# Patient Record
Sex: FEMALE | Race: WHITE | NOT HISPANIC OR LATINO | Employment: FULL TIME | URBAN - METROPOLITAN AREA
[De-identification: names, ages, dates, MRNs, and addresses within clinical notes are randomized per-mention and may not be internally consistent; named-entity substitution may affect disease eponyms.]

---

## 2021-02-22 ENCOUNTER — TELEPHONE (OUTPATIENT)
Dept: OBSTETRICS AND GYNECOLOGY | Facility: CLINIC | Age: 36
End: 2021-02-22

## 2021-02-22 DIAGNOSIS — N75.1 BARTHOLIN'S GLAND ABSCESS: Primary | ICD-10-CM

## 2021-02-22 RX ORDER — LEVOFLOXACIN 500 MG/1
500 TABLET, FILM COATED ORAL DAILY
Qty: 7 TABLET | Refills: 0 | Status: SHIPPED | OUTPATIENT
Start: 2021-02-22 | End: 2021-03-01

## 2021-02-22 RX ORDER — BUPROPION HYDROCHLORIDE 150 MG/1
150 TABLET ORAL DAILY
COMMUNITY
End: 2023-03-27

## 2021-02-22 RX ORDER — LABETALOL 300 MG/1
300 TABLET, FILM COATED ORAL 2 TIMES DAILY
COMMUNITY

## 2021-02-23 ENCOUNTER — PROCEDURE VISIT (OUTPATIENT)
Dept: OBSTETRICS AND GYNECOLOGY | Facility: CLINIC | Age: 36
End: 2021-02-23
Payer: COMMERCIAL

## 2021-02-23 DIAGNOSIS — N75.0 BARTHOLIN'S GLAND CYST: Primary | ICD-10-CM

## 2021-02-23 DIAGNOSIS — N75.1 BARTHOLIN'S GLAND ABSCESS: ICD-10-CM

## 2021-02-23 PROCEDURE — 56420 I&D BARTHOLINS GLAND ABSCESS: CPT | Mod: S$GLB,,, | Performed by: OBSTETRICS & GYNECOLOGY

## 2021-02-23 PROCEDURE — 56420 INCISION AND DRAINAGE: ICD-10-PCS | Mod: S$GLB,,, | Performed by: OBSTETRICS & GYNECOLOGY

## 2021-02-25 LAB — CULTURE: NORMAL

## 2021-03-02 ENCOUNTER — TELEPHONE (OUTPATIENT)
Dept: OBSTETRICS AND GYNECOLOGY | Facility: CLINIC | Age: 36
End: 2021-03-02

## 2021-03-08 RX ORDER — TIZANIDINE 4 MG/1
TABLET ORAL
COMMUNITY
Start: 2021-02-23

## 2021-03-08 RX ORDER — GABAPENTIN 300 MG/1
CAPSULE ORAL
COMMUNITY
Start: 2021-02-23

## 2021-03-09 ENCOUNTER — OFFICE VISIT (OUTPATIENT)
Dept: OBSTETRICS AND GYNECOLOGY | Facility: CLINIC | Age: 36
End: 2021-03-09
Payer: COMMERCIAL

## 2021-03-09 ENCOUNTER — TELEPHONE (OUTPATIENT)
Dept: OBSTETRICS AND GYNECOLOGY | Facility: CLINIC | Age: 36
End: 2021-03-09

## 2021-03-09 VITALS
HEART RATE: 97 BPM | HEIGHT: 65 IN | WEIGHT: 166.38 LBS | DIASTOLIC BLOOD PRESSURE: 72 MMHG | SYSTOLIC BLOOD PRESSURE: 122 MMHG | BODY MASS INDEX: 27.72 KG/M2

## 2021-03-09 DIAGNOSIS — Z01.818 PREOPERATIVE EXAM FOR GYNECOLOGIC SURGERY: Primary | ICD-10-CM

## 2021-03-09 DIAGNOSIS — G89.18 POST-OP PAIN: ICD-10-CM

## 2021-03-09 DIAGNOSIS — Z01.419 ENCOUNTER FOR WELL WOMAN EXAM WITH ROUTINE GYNECOLOGICAL EXAM: ICD-10-CM

## 2021-03-09 DIAGNOSIS — N75.0 BARTHOLIN'S GLAND CYST: ICD-10-CM

## 2021-03-09 LAB
ANION GAP SERPL CALC-SCNC: 10 MMOL/L (ref 3–11)
APPEARANCE, UA: CLEAR
B-HCG UR QL: NEGATIVE
BASOPHILS NFR BLD: 0.5 % (ref 0–3)
BILIRUB UR QL STRIP: NEGATIVE MG/DL
BUN SERPL-MCNC: 12 MG/DL (ref 7–18)
BUN/CREAT SERPL: 17.64 RATIO (ref 7–18)
CALCIUM BLD-MCNC: POSITIVE MG/DL
CALCIUM SERPL-MCNC: 9.3 MG/DL (ref 8.8–10.5)
CHLORIDE SERPL-SCNC: 104 MMOL/L (ref 100–108)
CO2 SERPL-SCNC: 27 MMOL/L (ref 21–32)
COLOR UR: NORMAL
COV PREGNANCY STATUS: ABNORMAL
COVID-19 AB, IGM: NEGATIVE
CREAT SERPL-MCNC: 0.68 MG/DL (ref 0.55–1.02)
EOSINOPHIL NFR BLD: 1.7 % (ref 1–3)
ERYTHROCYTE [DISTWIDTH] IN BLOOD BY AUTOMATED COUNT: 12.4 % (ref 12.5–18)
GFR ESTIMATION: > 60
GLUCOSE (UA): NORMAL MG/DL
GLUCOSE SERPL-MCNC: 100 MG/DL (ref 70–110)
HCT VFR BLD AUTO: 38.4 % (ref 37–47)
HGB BLD-MCNC: 12.5 G/DL (ref 12–16)
HGB UR QL STRIP: NEGATIVE /UL
KETONES UR QL STRIP: NEGATIVE MG/DL
LEUKOCYTE ESTERASE UR QL STRIP: NEGATIVE /UL
LYMPHOCYTES NFR BLD: 27.5 % (ref 25–40)
MCH RBC QN AUTO: 29 PG (ref 27–31.2)
MCHC RBC AUTO-ENTMCNC: 32.6 G/DL (ref 31.8–35.4)
MCV RBC AUTO: 89.1 FL (ref 80–97)
MONOCYTES NFR BLD: 7.6 % (ref 1–15)
NEUTROPHILS # BLD AUTO: 6.17 10*3/UL (ref 1.8–7.7)
NEUTROPHILS NFR BLD: 62.2 % (ref 37–80)
NITRITE UR QL STRIP: NEGATIVE
NUCLEATED RED BLOOD CELLS: 0 %
PATIENT EMPLOYED IN HEALTHCARE: NO
PATIENT HAS SYMPTOMS FOR CONDITION OF INTEREST: NO
PATIENT HOSPITALIZED BC COND: NO
PATIENT IN CONGREGATE CARE: NO
PH UR STRIP: 8 PH (ref 5–9)
PLATELETS: 331 10*3/UL (ref 142–424)
POTASSIUM SERPL-SCNC: 4.4 MMOL/L (ref 3.6–5.2)
PROT UR QL STRIP: NEGATIVE MG/DL
RBC # BLD AUTO: 4.31 10*6/UL (ref 4.2–5.4)
RPR: NON REACTIVE
SODIUM BLD-SCNC: 141 MMOL/L (ref 135–145)
SP GR UR STRIP: 1.01 (ref 1–1.03)
SPECIMEN COLLECTION METHOD, URINE: NORMAL
UROBILINOGEN UR STRIP-ACNC: NORMAL MG/DL
WBC # BLD: 9.9 10*3/UL (ref 4.6–10.2)

## 2021-03-09 PROCEDURE — 99395 PR PREVENTIVE VISIT,EST,18-39: ICD-10-PCS | Mod: S$GLB,,, | Performed by: OBSTETRICS & GYNECOLOGY

## 2021-03-09 PROCEDURE — 99214 PR OFFICE/OUTPT VISIT, EST, LEVL IV, 30-39 MIN: ICD-10-PCS | Mod: 25,S$GLB,, | Performed by: OBSTETRICS & GYNECOLOGY

## 2021-03-09 PROCEDURE — 99214 OFFICE O/P EST MOD 30 MIN: CPT | Mod: 25,S$GLB,, | Performed by: OBSTETRICS & GYNECOLOGY

## 2021-03-09 PROCEDURE — 99395 PREV VISIT EST AGE 18-39: CPT | Mod: S$GLB,,, | Performed by: OBSTETRICS & GYNECOLOGY

## 2021-03-09 RX ORDER — HYDROCODONE BITARTRATE AND ACETAMINOPHEN 5; 325 MG/1; MG/1
1 TABLET ORAL EVERY 6 HOURS PRN
Qty: 12 TABLET | Refills: 0 | Status: SHIPPED | OUTPATIENT
Start: 2021-03-09 | End: 2021-03-23

## 2021-03-12 ENCOUNTER — OUTSIDE PLACE OF SERVICE (OUTPATIENT)
Dept: OBSTETRICS AND GYNECOLOGY | Facility: CLINIC | Age: 36
End: 2021-03-12
Payer: COMMERCIAL

## 2021-03-12 ENCOUNTER — TELEPHONE (OUTPATIENT)
Dept: OBSTETRICS AND GYNECOLOGY | Facility: CLINIC | Age: 36
End: 2021-03-12

## 2021-03-12 DIAGNOSIS — T88.59XA NAUSEA AFTER ANESTHESIA, INITIAL ENCOUNTER: Primary | ICD-10-CM

## 2021-03-12 DIAGNOSIS — R11.0 NAUSEA AFTER ANESTHESIA, INITIAL ENCOUNTER: Primary | ICD-10-CM

## 2021-03-12 PROCEDURE — 56440 MRSPLZATN BRTHLNS GLND CST: CPT | Mod: ,,, | Performed by: OBSTETRICS & GYNECOLOGY

## 2021-03-12 PROCEDURE — 56440 PR MARSUP BARTHOLIN GLAND CYST: ICD-10-PCS | Mod: ,,, | Performed by: OBSTETRICS & GYNECOLOGY

## 2021-03-12 RX ORDER — ONDANSETRON 4 MG/1
4 TABLET, ORALLY DISINTEGRATING ORAL EVERY 8 HOURS PRN
Qty: 12 TABLET | Refills: 1 | Status: SHIPPED | OUTPATIENT
Start: 2021-03-12 | End: 2021-03-23

## 2021-03-12 RX ORDER — PROMETHAZINE HYDROCHLORIDE 25 MG/1
25 SUPPOSITORY RECTAL EVERY 6 HOURS PRN
Qty: 12 SUPPOSITORY | Refills: 1 | Status: SHIPPED | OUTPATIENT
Start: 2021-03-12 | End: 2021-03-23

## 2021-03-16 ENCOUNTER — TELEPHONE (OUTPATIENT)
Dept: OBSTETRICS AND GYNECOLOGY | Facility: CLINIC | Age: 36
End: 2021-03-16

## 2021-03-16 LAB — SPECIMEN TO PATHOLOGY: NORMAL

## 2021-03-23 ENCOUNTER — OFFICE VISIT (OUTPATIENT)
Dept: OBSTETRICS AND GYNECOLOGY | Facility: CLINIC | Age: 36
End: 2021-03-23
Payer: COMMERCIAL

## 2021-03-23 VITALS
SYSTOLIC BLOOD PRESSURE: 114 MMHG | HEIGHT: 65 IN | HEART RATE: 101 BPM | BODY MASS INDEX: 28.39 KG/M2 | WEIGHT: 170.38 LBS | DIASTOLIC BLOOD PRESSURE: 76 MMHG

## 2021-03-23 DIAGNOSIS — R39.15 URINARY URGENCY: ICD-10-CM

## 2021-03-23 DIAGNOSIS — B37.31 YEAST VAGINITIS: ICD-10-CM

## 2021-03-23 DIAGNOSIS — Z98.890 POST-OPERATIVE STATE: Primary | ICD-10-CM

## 2021-03-23 PROCEDURE — 1125F AMNT PAIN NOTED PAIN PRSNT: CPT | Mod: S$GLB,,, | Performed by: OBSTETRICS & GYNECOLOGY

## 2021-03-23 PROCEDURE — 99024 PR POST-OP FOLLOW-UP VISIT: ICD-10-PCS | Mod: S$GLB,,, | Performed by: OBSTETRICS & GYNECOLOGY

## 2021-03-23 PROCEDURE — 3008F BODY MASS INDEX DOCD: CPT | Mod: CPTII,S$GLB,, | Performed by: OBSTETRICS & GYNECOLOGY

## 2021-03-23 PROCEDURE — 3008F PR BODY MASS INDEX (BMI) DOCUMENTED: ICD-10-PCS | Mod: CPTII,S$GLB,, | Performed by: OBSTETRICS & GYNECOLOGY

## 2021-03-23 PROCEDURE — 1125F PR PAIN SEVERITY QUANTIFIED, PAIN PRESENT: ICD-10-PCS | Mod: S$GLB,,, | Performed by: OBSTETRICS & GYNECOLOGY

## 2021-03-23 PROCEDURE — 99024 POSTOP FOLLOW-UP VISIT: CPT | Mod: S$GLB,,, | Performed by: OBSTETRICS & GYNECOLOGY

## 2021-03-23 RX ORDER — NITROFURANTOIN 25; 75 MG/1; MG/1
100 CAPSULE ORAL 2 TIMES DAILY
Qty: 14 CAPSULE | Refills: 0 | Status: SHIPPED | OUTPATIENT
Start: 2021-03-23 | End: 2021-03-30

## 2021-03-23 RX ORDER — FLUCONAZOLE 150 MG/1
150 TABLET ORAL EVERY OTHER DAY
Qty: 3 TABLET | Refills: 0 | Status: SHIPPED | OUTPATIENT
Start: 2021-03-23 | End: 2021-03-28

## 2021-03-24 LAB
AMORPH URATE CRY URNS QL MICRO: ABNORMAL
BACTERIA #/AREA URNS HPF: ABNORMAL /[HPF]
BILIRUB UR QL STRIP: NEGATIVE
CLARITY UR: ABNORMAL
COLOR UR: YELLOW
EPITHELIAL CELLS: ABNORMAL
GLUCOSE (UA): NEGATIVE MG/DL
KETONES UR QL STRIP: NEGATIVE MG/DL
LEUKOCYTE ESTERASE UR QL STRIP: NEGATIVE
MUCOUS THREADS URNS QL MICRO: NEGATIVE
NITRITE UR QL STRIP: NEGATIVE
OCCULT BLOOD: NEGATIVE
PH, URINE: 8 (ref 5–7.5)
PROT UR QL STRIP: NEGATIVE MG/DL
RBC/HPF: NEGATIVE
SP GR UR STRIP: 1.01 (ref 1–1.03)
UROBILINOGEN, URINE: NORMAL E.U./DL (ref 0–1)
WBC/HPF: ABNORMAL

## 2021-03-26 LAB — URINE CULTURE, ROUTINE: NORMAL

## 2021-04-08 ENCOUNTER — OFFICE VISIT (OUTPATIENT)
Dept: OBSTETRICS AND GYNECOLOGY | Facility: CLINIC | Age: 36
End: 2021-04-08
Payer: COMMERCIAL

## 2021-04-08 VITALS
BODY MASS INDEX: 28.93 KG/M2 | HEART RATE: 89 BPM | WEIGHT: 173.63 LBS | DIASTOLIC BLOOD PRESSURE: 79 MMHG | SYSTOLIC BLOOD PRESSURE: 124 MMHG | HEIGHT: 65 IN

## 2021-04-08 DIAGNOSIS — Z98.890 POST-OPERATIVE STATE: Primary | ICD-10-CM

## 2021-04-08 PROCEDURE — 3008F PR BODY MASS INDEX (BMI) DOCUMENTED: ICD-10-PCS | Mod: CPTII,S$GLB,, | Performed by: OBSTETRICS & GYNECOLOGY

## 2021-04-08 PROCEDURE — 1126F PR PAIN SEVERITY QUANTIFIED, NO PAIN PRESENT: ICD-10-PCS | Mod: S$GLB,,, | Performed by: OBSTETRICS & GYNECOLOGY

## 2021-04-08 PROCEDURE — 1126F AMNT PAIN NOTED NONE PRSNT: CPT | Mod: S$GLB,,, | Performed by: OBSTETRICS & GYNECOLOGY

## 2021-04-08 PROCEDURE — 3008F BODY MASS INDEX DOCD: CPT | Mod: CPTII,S$GLB,, | Performed by: OBSTETRICS & GYNECOLOGY

## 2021-04-08 PROCEDURE — 99024 POSTOP FOLLOW-UP VISIT: CPT | Mod: S$GLB,,, | Performed by: OBSTETRICS & GYNECOLOGY

## 2021-04-08 PROCEDURE — 99024 PR POST-OP FOLLOW-UP VISIT: ICD-10-PCS | Mod: S$GLB,,, | Performed by: OBSTETRICS & GYNECOLOGY

## 2022-03-23 ENCOUNTER — OFFICE VISIT (OUTPATIENT)
Dept: OBSTETRICS AND GYNECOLOGY | Facility: CLINIC | Age: 37
End: 2022-03-23
Payer: COMMERCIAL

## 2022-03-23 VITALS
HEART RATE: 90 BPM | SYSTOLIC BLOOD PRESSURE: 131 MMHG | WEIGHT: 171.19 LBS | DIASTOLIC BLOOD PRESSURE: 91 MMHG | HEIGHT: 65 IN | BODY MASS INDEX: 28.52 KG/M2

## 2022-03-23 DIAGNOSIS — Z01.419 ENCOUNTER FOR WELL WOMAN EXAM WITH ROUTINE GYNECOLOGICAL EXAM: Primary | ICD-10-CM

## 2022-03-23 DIAGNOSIS — Z71.85 HPV VACCINE COUNSELING: ICD-10-CM

## 2022-03-23 DIAGNOSIS — Z12.31 BREAST CANCER SCREENING BY MAMMOGRAM: ICD-10-CM

## 2022-03-23 PROCEDURE — 3008F BODY MASS INDEX DOCD: CPT | Mod: CPTII,S$GLB,, | Performed by: OBSTETRICS & GYNECOLOGY

## 2022-03-23 PROCEDURE — 3080F DIAST BP >= 90 MM HG: CPT | Mod: CPTII,S$GLB,, | Performed by: OBSTETRICS & GYNECOLOGY

## 2022-03-23 PROCEDURE — 1159F MED LIST DOCD IN RCRD: CPT | Mod: CPTII,S$GLB,, | Performed by: OBSTETRICS & GYNECOLOGY

## 2022-03-23 PROCEDURE — 90651 HPV VACCINE 9-VALENT 3 DOSE IM: ICD-10-PCS | Mod: S$GLB,,, | Performed by: OBSTETRICS & GYNECOLOGY

## 2022-03-23 PROCEDURE — 90471 PR IMMUNIZ ADMIN,1 SINGLE/COMB VAC/TOXOID: ICD-10-PCS | Mod: S$GLB,,, | Performed by: OBSTETRICS & GYNECOLOGY

## 2022-03-23 PROCEDURE — 1160F RVW MEDS BY RX/DR IN RCRD: CPT | Mod: CPTII,S$GLB,, | Performed by: OBSTETRICS & GYNECOLOGY

## 2022-03-23 PROCEDURE — 1159F PR MEDICATION LIST DOCUMENTED IN MEDICAL RECORD: ICD-10-PCS | Mod: CPTII,S$GLB,, | Performed by: OBSTETRICS & GYNECOLOGY

## 2022-03-23 PROCEDURE — 99395 PREV VISIT EST AGE 18-39: CPT | Mod: 25,S$GLB,, | Performed by: OBSTETRICS & GYNECOLOGY

## 2022-03-23 PROCEDURE — 4010F PR ACE/ARB THEARPY RXD/TAKEN: ICD-10-PCS | Mod: CPTII,S$GLB,, | Performed by: OBSTETRICS & GYNECOLOGY

## 2022-03-23 PROCEDURE — 3075F SYST BP GE 130 - 139MM HG: CPT | Mod: CPTII,S$GLB,, | Performed by: OBSTETRICS & GYNECOLOGY

## 2022-03-23 PROCEDURE — 90471 IMMUNIZATION ADMIN: CPT | Mod: S$GLB,,, | Performed by: OBSTETRICS & GYNECOLOGY

## 2022-03-23 PROCEDURE — 3008F PR BODY MASS INDEX (BMI) DOCUMENTED: ICD-10-PCS | Mod: CPTII,S$GLB,, | Performed by: OBSTETRICS & GYNECOLOGY

## 2022-03-23 PROCEDURE — 3075F PR MOST RECENT SYSTOLIC BLOOD PRESS GE 130-139MM HG: ICD-10-PCS | Mod: CPTII,S$GLB,, | Performed by: OBSTETRICS & GYNECOLOGY

## 2022-03-23 PROCEDURE — 3080F PR MOST RECENT DIASTOLIC BLOOD PRESSURE >= 90 MM HG: ICD-10-PCS | Mod: CPTII,S$GLB,, | Performed by: OBSTETRICS & GYNECOLOGY

## 2022-03-23 PROCEDURE — 4010F ACE/ARB THERAPY RXD/TAKEN: CPT | Mod: CPTII,S$GLB,, | Performed by: OBSTETRICS & GYNECOLOGY

## 2022-03-23 PROCEDURE — 1160F PR REVIEW ALL MEDS BY PRESCRIBER/CLIN PHARMACIST DOCUMENTED: ICD-10-PCS | Mod: CPTII,S$GLB,, | Performed by: OBSTETRICS & GYNECOLOGY

## 2022-03-23 PROCEDURE — 99395 PR PREVENTIVE VISIT,EST,18-39: ICD-10-PCS | Mod: 25,S$GLB,, | Performed by: OBSTETRICS & GYNECOLOGY

## 2022-03-23 PROCEDURE — 90651 9VHPV VACCINE 2/3 DOSE IM: CPT | Mod: S$GLB,,, | Performed by: OBSTETRICS & GYNECOLOGY

## 2022-03-23 RX ORDER — LISINOPRIL 10 MG/1
10 TABLET ORAL DAILY
COMMUNITY
Start: 2022-03-07 | End: 2023-03-27

## 2022-03-23 RX ORDER — METHYLPREDNISOLONE 4 MG/1
TABLET ORAL
COMMUNITY
Start: 2022-03-15 | End: 2022-03-23

## 2022-03-23 RX ORDER — DOXYCYCLINE 100 MG/1
100 CAPSULE ORAL EVERY 12 HOURS
COMMUNITY
Start: 2022-03-08 | End: 2022-03-23

## 2022-03-23 NOTE — PROGRESS NOTES
CC: WELL WOMAN (age 26-39)     Patient Care Team:  Caridad Pugh MD as PCP - General (Family Medicine)  Hernan Allen MD (Physical Medicine and Rehabilitation)  Halle Villavicencio MD as Consulting Physician (Gastroenterology)    The patient's last visit with me was on 2021 for post op for excision of left bartholins gland    HPI:  Patient is a 37 y.o. who presents for her well woman exam today.  History reviewed with patient.   Patient is without complaints or concerns today. Periods still very regular every 37-38 days    Patient's last menstrual period was 2022 (exact date).      CONTRACEPTION: BTL  GARDASIL: no and would like to get a vaccination today while she is here counseled on benefits and risks of Gardasil-9 vaccination and all questions answered. VIS given to patient to review prior to vaccination. Consent given   HX ABNL PAPS: none    REVIEW OF DATA/ HEALTH MAINTENANCE  LAST ANNUAL/ PAP:   2021    LAST LABS- due now- does with pcp    Past Medical History:   Diagnosis Date    Anxiety     HTN (hypertension)     Thyroid nodule      SURGICAL HX:   has a past surgical history that includes  section ();  section with tubal ligation (); Appendectomy; Marsupialization of Bartholin's gland cyst (Left, ); and excision of bartholins (Left, ).    SOCIAL HX:   reports that she has never smoked. She has never used smokeless tobacco. She reports current alcohol use. She reports that she does not use drugs.    FAMILY HX:  family history includes Hypertension in her mother. .    ALLERGIES:  Clindamycin, Pcn [penicillins], and Sulfa (sulfonamide antibiotics)    Current Outpatient Medications:     buPROPion (WELLBUTRIN XL) 150 MG TB24 tablet, Take 150 mg by mouth once daily., Disp: , Rfl:     gabapentin (NEURONTIN) 300 MG capsule, , Disp: , Rfl:     labetaloL (NORMODYNE) 300 MG tablet, Take 300 mg by mouth 2 (two) times daily., Disp: , Rfl:      "lisinopriL 10 MG tablet, Take 10 mg by mouth once daily., Disp: , Rfl:     tiZANidine (ZANAFLEX) 4 MG tablet, , Disp: , Rfl:     ROS:  CONST:  No fever, chills, fatigue or unexpected changes in weight  CV: No chest pain or palpitations  RESP:  No shortness of breath or cough  GI: No abd pain, vomiting, diarrhea, blood in stool, or changes in bowel mvmts  SKIN: No rashes or lesions  MUSCULOSKELETAL: No joint swelling or pain  PSYCH: No changes in mood or insomia  BREASTS: No asymmetry, lumps, pain, nipple discharge, or skin changes  :  No dysuria, urgency, frequency, hematuria or incontinence          No vag dc, itching, odor or dryness          No pelvic pain, dyspareunia, or abnormal vaginal bleeding    VITALS:  Blood pressure (!) 131/91, pulse 90, height 5' 5" (1.651 m), weight 77.7 kg (171 lb 3.2 oz), last menstrual period 03/06/2022.  Body mass index is 28.49 kg/m².   PHYSICAL EXAM-  APPEARANCE: Well appearing, in no acute distress.  NECK: Neck symmetric without masses or thyromegaly.  CV:  Normal rate  PULM: Normal resp rate, no resp distress, normal resp effort  PSYCH: Normal mood and affect, cooperative  SKIN: No rashes, lesions, or abnormal bruising  LYMPH: No inguinal or axillary adenopathy  ABD: Soft, without tenderness or masses. No palpable hernias or hsm  BREASTS: Symmetrical, no skin changes or lesions. No palpable masses, tenderness, or nipple dc  PELVIC:  VULVA: No lesions. Normal female genitalia.  URETHRAL MEATUS: No masses, no prolapse.  BLADDER/ URETHRA: No masses or suprapubic tenderness  VAGINA: No discharge, erythema, or lesions. No significant cystocele or rectocele.   CVX: No lesions,no cervical motion tenderness   UTERUS: Normal size/shape, mobile, non-tender  ADNEXA: No masses, tenderness, or fullness.  ANUS/ PERINEUM: Normal tone.  No lesions.  *female chaperone present for entire exam    ASSESSMENT and PLAN:  Encounter for well woman exam with routine gynecological exam  -     " Liquid-based pap smear, screening    HPV vaccine counseling  -     HPV Vaccine (9-Valent) (3 Dose) (IM)    Breast cancer screening by mammogram  -     Mammo Digital Screening Bilat w/ Delbert; Future; Expected date: 04/06/2022     FOLLOWUP:  1 yr for wwe or sooner prn    COUNSELING:  Patient was counseled today on recommendation for yearly pelvic exams, current Pap guidelines, self breast exams, contraception, and recommendations for annual screening mammograms at age 40. Encouraged patient to see her PCP for other health maintenance.

## 2022-05-23 DIAGNOSIS — Z71.85 HPV VACCINE COUNSELING: Primary | ICD-10-CM

## 2022-05-25 ENCOUNTER — CLINICAL SUPPORT (OUTPATIENT)
Dept: OBSTETRICS AND GYNECOLOGY | Facility: CLINIC | Age: 37
End: 2022-05-25
Payer: COMMERCIAL

## 2022-05-25 DIAGNOSIS — Z71.85 HPV VACCINE COUNSELING: ICD-10-CM

## 2022-05-25 PROCEDURE — 90471 HPV VACCINE 9-VALENT 3 DOSE IM: ICD-10-PCS | Mod: S$GLB,,, | Performed by: OBSTETRICS & GYNECOLOGY

## 2022-05-25 PROCEDURE — 90471 IMMUNIZATION ADMIN: CPT | Mod: S$GLB,,, | Performed by: OBSTETRICS & GYNECOLOGY

## 2022-05-25 PROCEDURE — 90651 9VHPV VACCINE 2/3 DOSE IM: CPT | Mod: S$GLB,,, | Performed by: OBSTETRICS & GYNECOLOGY

## 2022-05-25 PROCEDURE — 90651 HPV VACCINE 9-VALENT 3 DOSE IM: ICD-10-PCS | Mod: S$GLB,,, | Performed by: OBSTETRICS & GYNECOLOGY

## 2022-09-26 ENCOUNTER — CLINICAL SUPPORT (OUTPATIENT)
Dept: OBSTETRICS AND GYNECOLOGY | Facility: CLINIC | Age: 37
End: 2022-09-26
Payer: COMMERCIAL

## 2022-09-26 VITALS
BODY MASS INDEX: 29.95 KG/M2 | HEART RATE: 85 BPM | WEIGHT: 169 LBS | DIASTOLIC BLOOD PRESSURE: 81 MMHG | SYSTOLIC BLOOD PRESSURE: 122 MMHG | HEIGHT: 63 IN

## 2022-09-26 DIAGNOSIS — Z71.85 HPV VACCINE COUNSELING: ICD-10-CM

## 2022-09-26 DIAGNOSIS — Z71.85 HPV VACCINE COUNSELING: Primary | ICD-10-CM

## 2022-09-26 PROCEDURE — 90651 HPV VACCINE 9-VALENT 3 DOSE IM: ICD-10-PCS | Mod: S$GLB,,, | Performed by: OBSTETRICS & GYNECOLOGY

## 2022-09-26 PROCEDURE — 90471 IMMUNIZATION ADMIN: CPT | Mod: S$GLB,,, | Performed by: OBSTETRICS & GYNECOLOGY

## 2022-09-26 PROCEDURE — 90471 HPV VACCINE 9-VALENT 3 DOSE IM: ICD-10-PCS | Mod: S$GLB,,, | Performed by: OBSTETRICS & GYNECOLOGY

## 2022-09-26 PROCEDURE — 90651 9VHPV VACCINE 2/3 DOSE IM: CPT | Mod: S$GLB,,, | Performed by: OBSTETRICS & GYNECOLOGY

## 2022-09-26 NOTE — PROGRESS NOTES
Patient's allergies verified. Patient's name and date of birth verified. Injection given IM in right deltoid. Patient waited 15 minutes after injection prior to leaving.

## 2023-03-27 RX ORDER — LOSARTAN POTASSIUM 25 MG/1
TABLET ORAL
COMMUNITY
Start: 2022-12-06

## 2023-03-27 RX ORDER — BUPROPION HYDROCHLORIDE 300 MG/1
TABLET ORAL
COMMUNITY
Start: 2022-10-17

## 2023-03-27 NOTE — PROGRESS NOTES
CC: WELL WOMAN (age 26-39)     Patient Care Team:  Caridad Pugh MD as PCP - General (Family Medicine)  Hernan Allen MD (Physical Medicine and Rehabilitation)  Halle Villavicencio MD as Consulting Physician (Gastroenterology)    Last visit with me was on  3/23/2022 for wwe    HPI:  Patient is a 38 y.o. who presents for her well woman exam today.  History reviewed with patient.   Patient is without complaints or concerns today. Has noticed off and on in lateral breasts and sometimes medial breasts, she will feel little tender lumps. 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     CONTRACEPTION: BTL  GARDASIL: no- declines  HX ABNL PAPS: none    REVIEW OF DATA/ HEALTH MAINTENANCE  LAST ANNUAL:   2023  LAST LABS- up to date with PCP     Past Medical History:   Diagnosis Date    Anxiety     HTN (hypertension)     Thyroid nodule      SURGICAL HX:   has a past surgical history that includes  section ();  section with tubal ligation (); Appendectomy; Marsupialization of Bartholin's gland cyst (Left, ); and excision of bartholins (Left, ).    SOCIAL HX:   reports that she has never smoked. She has never used smokeless tobacco. She reports current alcohol use. She reports that she does not use drugs.    FAMILY HX:  family history includes Hypertension in her mother. .    ALLERGIES:  Clindamycin, Pcn [penicillins], and Sulfa (sulfonamide antibiotics)    Current Outpatient Medications   Medication Instructions    buPROPion (WELLBUTRIN XL) 300 MG 24 hr tablet No dose, route, or frequency recorded.    gabapentin (NEURONTIN) 300 MG capsule No dose, route, or frequency recorded.    labetaloL (NORMODYNE) 300 mg, Oral, 2 times daily    losartan (COZAAR) 25 MG tablet No dose, route, or frequency recorded.    tiZANidine (ZANAFLEX) 4 MG tablet No dose, route, or frequency recorded.     ROS:  CONST:  No fever, chills, fatigue or unexpected changes in weight   CV:  "No chest pain or palpitations   RESP:  No shortness of breath or cough   GI: No abd pain, vomiting, diarrhea, blood in stool, or changes in bowel mvmts   SKIN: No rashes or lesions  MUSCULOSKELETAL: No joint swelling or pain   PSYCH: No changes in mood or insomia   BREASTS: No asymmetry, lumps, pain, nipple discharge, or skin changes   :  No dysuria, urgency, frequency, hematuria or incontinence           No vag dc, itching, odor or dryness           No pelvic pain, dyspareunia, or abnormal vaginal bleeding     VITALS:  Blood pressure (!) 145/91, height 5' 3" (1.6 m), weight 75.8 kg (167 lb), last menstrual period 03/29/2023.  Body mass index is 29.58 kg/m².     PHYSICAL EXAM-  APPEARANCE: Well appearing, in no acute distress.   NECK: Neck symmetric.   CV:  Normal rate   PULM: Normal resp rate, no resp distress, normal resp effort    PSYCH: Normal mood and affect, cooperative   SKIN: No rashes, lesions, or abnormal bruising   LYMPH: No inguinal or axillary adenopathy   ABD: Soft, without tenderness or masses.   BREAST: Symmetrical, no nipple changes, no skin changes, No palpable masses   PELVIC:  VULVA: No lesions. Normal female genitalia.  URETHRAL MEATUS: No masses, no significant prolapse.   BLADDER/ URETHRA: No masses or suprapubic tenderness   VAGINA: No lesions or erythema, No discharge.   CVX: No lesions,no cervical motion tenderness.   UTERUS: Normal size/shape, mobile, non-tender   ADNEXA: No masses, tenderness, or fullness.   ANUS/ PERINEUM: Normal tone.  No lesions.     *female chaperone present for entire exam      ASSESSMENT and PLAN:  Encounter for well woman exam with routine gynecological exam  -     Liquid-based pap smear, screening     FOLLOWUP:  1 yr for wwe or sooner prn    COUNSELING:  Patient was counseled today on recommendation for yearly pelvic exams, current Pap guidelines, self breast exams, contraception, and recommendations for annual screening mammograms at age 40. Encouraged patient " to see her PCP for other health maintenance.

## 2023-03-29 ENCOUNTER — OFFICE VISIT (OUTPATIENT)
Dept: OBSTETRICS AND GYNECOLOGY | Facility: CLINIC | Age: 38
End: 2023-03-29
Payer: COMMERCIAL

## 2023-03-29 VITALS
WEIGHT: 167 LBS | DIASTOLIC BLOOD PRESSURE: 91 MMHG | BODY MASS INDEX: 29.59 KG/M2 | SYSTOLIC BLOOD PRESSURE: 145 MMHG | HEIGHT: 63 IN

## 2023-03-29 DIAGNOSIS — Z01.419 ENCOUNTER FOR WELL WOMAN EXAM WITH ROUTINE GYNECOLOGICAL EXAM: Primary | ICD-10-CM

## 2023-03-29 PROCEDURE — 4010F ACE/ARB THERAPY RXD/TAKEN: CPT | Mod: CPTII,S$GLB,, | Performed by: OBSTETRICS & GYNECOLOGY

## 2023-03-29 PROCEDURE — 1159F MED LIST DOCD IN RCRD: CPT | Mod: CPTII,S$GLB,, | Performed by: OBSTETRICS & GYNECOLOGY

## 2023-03-29 PROCEDURE — 3008F PR BODY MASS INDEX (BMI) DOCUMENTED: ICD-10-PCS | Mod: CPTII,S$GLB,, | Performed by: OBSTETRICS & GYNECOLOGY

## 2023-03-29 PROCEDURE — 4010F PR ACE/ARB THEARPY RXD/TAKEN: ICD-10-PCS | Mod: CPTII,S$GLB,, | Performed by: OBSTETRICS & GYNECOLOGY

## 2023-03-29 PROCEDURE — 99395 PREV VISIT EST AGE 18-39: CPT | Mod: S$GLB,,, | Performed by: OBSTETRICS & GYNECOLOGY

## 2023-03-29 PROCEDURE — 3080F PR MOST RECENT DIASTOLIC BLOOD PRESSURE >= 90 MM HG: ICD-10-PCS | Mod: CPTII,S$GLB,, | Performed by: OBSTETRICS & GYNECOLOGY

## 2023-03-29 PROCEDURE — 3077F PR MOST RECENT SYSTOLIC BLOOD PRESSURE >= 140 MM HG: ICD-10-PCS | Mod: CPTII,S$GLB,, | Performed by: OBSTETRICS & GYNECOLOGY

## 2023-03-29 PROCEDURE — 3008F BODY MASS INDEX DOCD: CPT | Mod: CPTII,S$GLB,, | Performed by: OBSTETRICS & GYNECOLOGY

## 2023-03-29 PROCEDURE — 3080F DIAST BP >= 90 MM HG: CPT | Mod: CPTII,S$GLB,, | Performed by: OBSTETRICS & GYNECOLOGY

## 2023-03-29 PROCEDURE — 3077F SYST BP >= 140 MM HG: CPT | Mod: CPTII,S$GLB,, | Performed by: OBSTETRICS & GYNECOLOGY

## 2023-03-29 PROCEDURE — 1159F PR MEDICATION LIST DOCUMENTED IN MEDICAL RECORD: ICD-10-PCS | Mod: CPTII,S$GLB,, | Performed by: OBSTETRICS & GYNECOLOGY

## 2023-03-29 PROCEDURE — 99395 PR PREVENTIVE VISIT,EST,18-39: ICD-10-PCS | Mod: S$GLB,,, | Performed by: OBSTETRICS & GYNECOLOGY

## 2023-04-05 LAB — Lab: NORMAL

## 2023-04-11 ENCOUNTER — TELEPHONE (OUTPATIENT)
Dept: GASTROENTEROLOGY | Facility: CLINIC | Age: 38
End: 2023-04-11
Payer: COMMERCIAL

## 2024-04-12 ENCOUNTER — PATIENT MESSAGE (OUTPATIENT)
Dept: OBSTETRICS AND GYNECOLOGY | Facility: CLINIC | Age: 39
End: 2024-04-12
Payer: COMMERCIAL

## 2024-05-14 ENCOUNTER — OFFICE VISIT (OUTPATIENT)
Dept: OBSTETRICS AND GYNECOLOGY | Facility: CLINIC | Age: 39
End: 2024-05-14
Payer: COMMERCIAL

## 2024-05-14 VITALS
BODY MASS INDEX: 29.23 KG/M2 | WEIGHT: 165 LBS | HEIGHT: 63 IN | DIASTOLIC BLOOD PRESSURE: 92 MMHG | SYSTOLIC BLOOD PRESSURE: 149 MMHG

## 2024-05-14 DIAGNOSIS — Z12.31 BREAST CANCER SCREENING BY MAMMOGRAM: ICD-10-CM

## 2024-05-14 DIAGNOSIS — Z01.419 ENCOUNTER FOR WELL WOMAN EXAM WITH ROUTINE GYNECOLOGICAL EXAM: Primary | ICD-10-CM

## 2024-05-14 PROCEDURE — 1159F MED LIST DOCD IN RCRD: CPT | Mod: CPTII,S$GLB,, | Performed by: OBSTETRICS & GYNECOLOGY

## 2024-05-14 PROCEDURE — 3008F BODY MASS INDEX DOCD: CPT | Mod: CPTII,S$GLB,, | Performed by: OBSTETRICS & GYNECOLOGY

## 2024-05-14 PROCEDURE — 4010F ACE/ARB THERAPY RXD/TAKEN: CPT | Mod: CPTII,S$GLB,, | Performed by: OBSTETRICS & GYNECOLOGY

## 2024-05-14 PROCEDURE — 99459 PELVIC EXAMINATION: CPT | Mod: S$GLB,,, | Performed by: OBSTETRICS & GYNECOLOGY

## 2024-05-14 PROCEDURE — 3077F SYST BP >= 140 MM HG: CPT | Mod: CPTII,S$GLB,, | Performed by: OBSTETRICS & GYNECOLOGY

## 2024-05-14 PROCEDURE — 99395 PREV VISIT EST AGE 18-39: CPT | Mod: S$GLB,,, | Performed by: OBSTETRICS & GYNECOLOGY

## 2024-05-14 PROCEDURE — 3080F DIAST BP >= 90 MM HG: CPT | Mod: CPTII,S$GLB,, | Performed by: OBSTETRICS & GYNECOLOGY

## 2024-05-14 RX ORDER — LISINOPRIL 10 MG/1
TABLET ORAL
COMMUNITY
Start: 2024-02-26

## 2024-05-14 RX ORDER — AMITRIPTYLINE HYDROCHLORIDE 10 MG/1
TABLET, FILM COATED ORAL
COMMUNITY
Start: 2024-04-30

## 2024-05-14 NOTE — PROGRESS NOTES
"CC: WELL WOMAN (age 26-39)     Patient Care Team:  Caridad Pugh MD as PCP - General (Family Medicine)  Hernan Allen MD (Physical Medicine and Rehabilitation)  Halle Villavicencio MD as Consulting Physician (Gastroenterology)    Last visit with me was on  3/29/2023 for wwe    HPI:  Patient is a 39 y.o. who presents for her well woman exam today.  History reviewed with patient.   Patient is without complaints or concerns today.     CONTRACEPTION: BTL  GARDASIL: no- declines  HX ABNL PAPS: none    REVIEW OF DATA/ HEALTH MAINTENANCE  LAST ANNUAL:   2023  LAST MMG- never     Past Medical History:   Diagnosis Date    Anxiety     HTN (hypertension)     Thyroid nodule      SURGICAL HX:   has a past surgical history that includes  section ();  section with tubal ligation (); Appendectomy; Marsupialization of Bartholin's gland cyst (Left, ); and excision of bartholins (Left, ).    SOCIAL HX:   reports that she has never smoked. She has never used smokeless tobacco. She reports current alcohol use. She reports that she does not use drugs.    Current Outpatient Medications   Medication Instructions    amitriptyline (ELAVIL) 10 MG tablet     buPROPion (WELLBUTRIN XL) 300 MG 24 hr tablet No dose, route, or frequency recorded.    gabapentin (NEURONTIN) 300 MG capsule No dose, route, or frequency recorded.    labetaloL (NORMODYNE) 300 mg, Oral, 2 times daily    lisinopriL 10 MG tablet     losartan (COZAAR) 25 MG tablet No dose, route, or frequency recorded.    tiZANidine (ZANAFLEX) 4 MG tablet No dose, route, or frequency recorded.       VITALS:  Blood pressure (!) 149/92, height 5' 3" (1.6 m), weight 74.8 kg (165 lb), last menstrual period 2024.  Body mass index is 29.23 kg/m².     PHYSICAL EXAM-  APPEARANCE: Well appearing, in no acute distress.  CV/ PULM: no resp distress, normal resp effort  PSYCH: Normal mood and affect, cooperative  SKIN: No rashes, lesions, or " abnormal bruising  ABD: Soft, no masses, tenderness, or distension  BREASTS: No skin changes,nipple dc. No palpable masses or tenderness  PELVIC:  VULVA: Normal female genitalia. No lesions  BLADDER: No suprapubic tenderness  VAGINA/ CVX:  No lesions, no d/c  UTERUS: mobile, non-tender  ADNEXA: No masses, tenderness, or fullness.  ANUS/ PERINEUM: Normal tone.  No lesions.           *patient verbally consented for exam and female chaperone present for entire exam    ASSESSMENT and PLAN:  Encounter for well woman exam with routine gynecological exam  -     Liquid-based pap smear, screening    Breast cancer screening by mammogram  -     Mammo Digital Screening Bilat w/ Delbert; Future; Expected date: 05/28/2024       FOLLOWUP:  1 yr for wwe or sooner prn    COUNSELING:  Patient was counseled today on recommendation for yearly pelvic exams, current Pap guidelines, self breast exams, contraception, and recommendations for annual screening mammograms at age 40. Encouraged patient to see her PCP for other health maintenance.

## 2024-05-16 LAB — Lab: NORMAL

## 2024-07-24 ENCOUNTER — DOCUMENTATION ONLY (OUTPATIENT)
Dept: OBSTETRICS AND GYNECOLOGY | Facility: CLINIC | Age: 39
End: 2024-07-24
Payer: COMMERCIAL

## 2025-05-19 ENCOUNTER — OFFICE VISIT (OUTPATIENT)
Dept: OBSTETRICS AND GYNECOLOGY | Facility: CLINIC | Age: 40
End: 2025-05-19
Payer: COMMERCIAL

## 2025-05-19 VITALS — SYSTOLIC BLOOD PRESSURE: 136 MMHG | DIASTOLIC BLOOD PRESSURE: 89 MMHG | WEIGHT: 168 LBS | BODY MASS INDEX: 29.76 KG/M2

## 2025-05-19 DIAGNOSIS — Z01.419 ENCOUNTER FOR WELL WOMAN EXAM WITH ROUTINE GYNECOLOGICAL EXAM: Primary | ICD-10-CM

## 2025-05-19 DIAGNOSIS — L70.9 ACNE, UNSPECIFIED ACNE TYPE: ICD-10-CM

## 2025-05-19 DIAGNOSIS — Z12.31 BREAST CANCER SCREENING BY MAMMOGRAM: ICD-10-CM

## 2025-05-19 DIAGNOSIS — N92.4 EXCESSIVE BLEEDING IN PREMENOPAUSAL PERIOD: ICD-10-CM

## 2025-05-19 PROCEDURE — 3008F BODY MASS INDEX DOCD: CPT | Mod: CPTII,,, | Performed by: OBSTETRICS & GYNECOLOGY

## 2025-05-19 PROCEDURE — 4010F ACE/ARB THERAPY RXD/TAKEN: CPT | Mod: CPTII,,, | Performed by: OBSTETRICS & GYNECOLOGY

## 2025-05-19 PROCEDURE — 1159F MED LIST DOCD IN RCRD: CPT | Mod: CPTII,,, | Performed by: OBSTETRICS & GYNECOLOGY

## 2025-05-19 PROCEDURE — 99395 PREV VISIT EST AGE 18-39: CPT | Mod: S$PBB,,, | Performed by: OBSTETRICS & GYNECOLOGY

## 2025-05-19 PROCEDURE — 3079F DIAST BP 80-89 MM HG: CPT | Mod: CPTII,,, | Performed by: OBSTETRICS & GYNECOLOGY

## 2025-05-19 PROCEDURE — 3075F SYST BP GE 130 - 139MM HG: CPT | Mod: CPTII,,, | Performed by: OBSTETRICS & GYNECOLOGY

## 2025-05-19 RX ORDER — BUPROPION HCL 150 MG
TABLET, EXTENDED RELEASE 24 HR ORAL
COMMUNITY
Start: 2025-01-06

## 2025-05-19 RX ORDER — PANTOPRAZOLE SODIUM 40 MG/1
40 TABLET, DELAYED RELEASE ORAL
COMMUNITY
Start: 2025-02-17

## 2025-05-19 NOTE — PROGRESS NOTES
CC: WELL WOMAN (age 26-39)     Patient Care Team:  Caridad Pugh MD as PCP - General (Family Medicine)  Hernan Allen MD (Physical Medicine and Rehabilitation)  Halle Villavicencio MD as Consulting Physician (Gastroenterology)        HPI:  Patient is a 40 y.o. who presents for her well woman exam today.  History reviewed with patient.   Periods still regular and still last 5 days but feels like days 2-3 are getting heavier than before. Also has been having more pain with ovulation than before. Could feel it in the past but now is actual pain- not just on one side- has felt on both sides. No dc no intermenstrual bleeding . Also has had some increased acne on her face/chin more recently.     CONTRACEPTION: BTL  GARDASIL: yes x 3  HX ABNL PAPS: none    REVIEW OF DATA/ HEALTH MAINTENANCE  LAST ANNUAL:   May 14 2024   LAST MMG- 2024-  [facility]    Past Medical History:   Diagnosis Date    Anxiety     HTN (hypertension)     Thyroid nodule      SURGICAL HX:   has a past surgical history that includes  section ();  section with tubal ligation (); Appendectomy; Marsupialization of Bartholin's gland cyst (Left, 2019); and excision of bartholins (Left, ).    SOCIAL HX:   reports that she has never smoked. She has never used smokeless tobacco. She reports current alcohol use. She reports that she does not use drugs.    Current Outpatient Medications   Medication Instructions    amitriptyline (ELAVIL) 10 MG tablet     gabapentin (NEURONTIN) 300 MG capsule No dose, route, or frequency recorded.    labetaloL (NORMODYNE) 300 mg, 2 times daily    losartan (COZAAR) 25 MG tablet No dose, route, or frequency recorded.    pantoprazole (PROTONIX) 40 mg    tiZANidine (ZANAFLEX) 4 MG tablet No dose, route, or frequency recorded.    WELLBUTRIN  mg TB24 tablet      VITALS:  Blood pressure 136/89, weight 76.2 kg (168 lb), last menstrual period 2025.  Body mass index is 29.76 kg/m².      PHYSICAL EXAM-  APPEARANCE: Well appearing, in no acute distress.  CV/ PULM: no resp distress, normal resp effort  PSYCH: Normal mood and affect, cooperative  SKIN: No rashes, lesions, or abnormal bruising  ABD: Soft, no masses, tenderness, or distension  BREASTS: No skin changes,nipple dc. No palpable masses or tenderness  PELVIC:  VULVA: Normal female genitalia. No lesions  BLADDER: No suprapubic tenderness  VAGINA/ CVX:  No lesions, no d/c  UTERUS: mobile, non-tender  ADNEXA: No masses, tenderness, or fullness.  ANUS/ PERINEUM: Normal tone.  No lesions.           *patient verbally consented for exam and female chaperone present for entire exam    ASSESSMENT and PLAN:  Encounter for well woman exam with routine gynecological exam  -     Liquid-based pap smear, screening    Breast cancer screening by mammogram  -     Mammo Digital Screening Bilat w/ Delbert ([facility]); Future; Expected date: 06/02/2025    Excessive bleeding in premenopausal period  -     CBC Auto Differential; Future  -     T4, Free; Future  -     TSH; Future  -     Follicle Stimulating Hormone; Future  -     US OB/GYN Procedure ([facility]); Future; Expected date: 05/19/2025    Acne, unspecified acne type  -     Luteinizing Hormone; Future  -     Testosterone,Total; Future  -     DHEA-Sulfate; Future    FOLLOWUP:  1 yr for wwe or sooner prn    COUNSELING:  Patient was counseled today on recommendation for yearly pelvic exams, current Pap guidelines, self breast exams, contraception, and recommendations for annual screening mammograms at age 40. Encouraged patient to see her PCP for other health maintenance.

## 2025-05-21 ENCOUNTER — TELEPHONE (OUTPATIENT)
Dept: OBSTETRICS AND GYNECOLOGY | Facility: CLINIC | Age: 40
End: 2025-05-21
Payer: COMMERCIAL

## 2025-05-21 ENCOUNTER — RESULTS FOLLOW-UP (OUTPATIENT)
Dept: OBSTETRICS AND GYNECOLOGY | Facility: CLINIC | Age: 40
End: 2025-05-21

## 2025-05-21 LAB
ABS NRBC COUNT: 0 X 10 3/UL (ref 0–0.01)
ABSOLUTE BASOPHIL: 0.05 X 10 3/UL (ref 0–0.22)
ABSOLUTE EOSINOPHIL: 0.17 X 10 3/UL (ref 0.04–0.54)
ABSOLUTE IMMATURE GRAN: 0.01 X 10 3/UL (ref 0–0.04)
ABSOLUTE LYMPHOCYTE: 2.65 X 10 3/UL (ref 0.86–4.75)
ABSOLUTE MONOCYTE: 0.5 X 10 3/UL (ref 0.22–1.08)
BASOPHILS NFR BLD: 0.6 % (ref 0.2–1.2)
DHEA SULFATE: 77.6 UG/DL (ref 60.9–337)
EOSINOPHIL NFR BLD: 2.2 % (ref 0.7–7)
FSH: 9.67 MIU/ML
HCT VFR BLD AUTO: 40.9 % (ref 37–47)
HGB BLD-MCNC: 13.2 G/DL (ref 12–16)
IMMATURE GRANULOCYTES: 0.1 % (ref 0–0.5)
LH: 11.3 MIU/ML
LYMPHOCYTES NFR BLD: 33.9 % (ref 19.3–53.1)
Lab: NORMAL
MCH RBC QN AUTO: 29.1 PG (ref 27–32)
MCHC RBC AUTO-ENTMCNC: 32.3 G/DL (ref 32–36)
MCV RBC AUTO: 90.3 FL (ref 82–100)
MONOCYTES NFR BLD: 6.4 % (ref 4.7–12.5)
NEUTROPHILS # BLD AUTO: 4.43 X 10 3/UL (ref 2.15–7.56)
NEUTROPHILS NFR BLD: 56.8 % (ref 34–71.1)
NUCLEATED RED BLOOD CELLS: 0 /100 WBC (ref 0–0.2)
PLATELET # BLD AUTO: 324 X 10 3/UL (ref 135–400)
RBC # BLD AUTO: 4.53 X 10 6/UL (ref 4.2–5.4)
RDW-SD: 39.8 FL (ref 37–54)
T4, FREE: 1.09 NG/DL (ref 0.93–1.7)
TESTOST SERPL-MCNC: 6.66 NG/DL (ref 8.4–48.1)
TSH SERPL DL<=0.005 MIU/L-ACNC: 1.59 UIU/ML (ref 0.27–4.2)
WBC # BLD: 7.81 X 10 3/UL (ref 4.3–10.8)

## 2025-05-21 NOTE — TELEPHONE ENCOUNTER
----- Message from Delaney Parish MD sent at 5/21/2025  4:55 PM CDT -----  Please let pt know her pap is negative and her labs all look normal as well. Her testosterone is on the low end but that is fine. We were checking to make sure it wasn't too high and causing the   acne. She has a ultrasound soon  ----- Message -----  From: Annabella, Lab In [facility]  Sent: 5/21/2025  10:56 AM CDT  To: Delaney Parish MD

## 2025-05-21 NOTE — TELEPHONE ENCOUNTER
Spoke with patient. Two pt identifiers confirmed. Notified patient of hre pap and lab results. Patient verbalized understanding.

## 2025-05-21 NOTE — PROGRESS NOTES
Please let pt know her pap is negative and her labs all look normal as well. Her testosterone is on the low end but that is fine. We were checking to make sure it wasn't too high and causing the acne. She has a ultrasound soon

## 2025-05-23 ENCOUNTER — PROCEDURE VISIT (OUTPATIENT)
Dept: OBSTETRICS AND GYNECOLOGY | Facility: CLINIC | Age: 40
End: 2025-05-23
Payer: COMMERCIAL

## 2025-05-23 DIAGNOSIS — N92.4 EXCESSIVE BLEEDING IN PREMENOPAUSAL PERIOD: ICD-10-CM

## 2025-05-23 PROCEDURE — 76830 TRANSVAGINAL US NON-OB: CPT | Mod: 26,S$PBB,, | Performed by: OBSTETRICS & GYNECOLOGY

## 2025-05-27 ENCOUNTER — TELEPHONE (OUTPATIENT)
Dept: OBSTETRICS AND GYNECOLOGY | Facility: CLINIC | Age: 40
End: 2025-05-27
Payer: COMMERCIAL

## 2025-05-27 NOTE — TELEPHONE ENCOUNTER
----- Message from Delaney Parish MD sent at 5/27/2025  4:51 PM CDT -----  Please let pt know her ultrasound looks normal. She may be getting some cysts that are going down/popping with each cycle. She currently doesn't have any  ----- Message -----  From: Annabella, Lab In [facility]  Sent: 5/27/2025   4:48 PM CDT  To: Delaney Parish MD

## 2025-05-27 NOTE — PROGRESS NOTES
Please let pt know her ultrasound looks normal. She may be getting some cysts that are going down/popping with each cycle. She currently doesn't have any